# Patient Record
Sex: MALE | Race: WHITE | Employment: FULL TIME | URBAN - METROPOLITAN AREA
[De-identification: names, ages, dates, MRNs, and addresses within clinical notes are randomized per-mention and may not be internally consistent; named-entity substitution may affect disease eponyms.]

---

## 2022-06-14 ENCOUNTER — OFFICE VISIT (OUTPATIENT)
Dept: ORTHOPEDIC SURGERY | Age: 27
End: 2022-06-14

## 2022-06-14 DIAGNOSIS — S99.921D FOOT INJURY, RIGHT, SUBSEQUENT ENCOUNTER: Primary | ICD-10-CM

## 2022-06-14 PROCEDURE — 99024 POSTOP FOLLOW-UP VISIT: CPT | Performed by: ORTHOPAEDIC SURGERY

## 2022-06-14 NOTE — PROGRESS NOTES
Name: Kelly Galaviz   YOB: 1995   Gender: male   MRN: [id number]       Summary: Zone 1 non displaced base 5th Metatarsal  fracture with significant ankle sprain: Right sided      Healing well   WBAT in shoe. F/u in 2 months      Close treatment begins in 4/1/2022           CC: Right lateral foot  pain      HPI: Kelly Galaviz is a  32 y.o. male who had a rolling of his ankle this past day. .  They immediately had lateral  foot pain, and soon began to swell. Ambulation has been very difficulty and painful. The pain is there all the time but more painful w/ direct pressure or weight bearing. He went to the urgent care diagnosed him with fifth metatarsal base fracture. I gave him a boot. He states he has a hard time walking cannot work at this point. We diagnosed him w/ a R zone 1 5th MT base fx. His pain has imprvoed in the boot. He is back at work. His pain is improving. He states he has a little bit of issues still but no signs of significant issues. He is back to doing most work with minimal pain. History was obtained by patient      ROS/Meds/PSH/PMH/FH/SH: I personally reviewed the patients standard intake form. Below are the pertinents      Tobacco:  has no history on file for tobacco use. Diabetes: None   Other: none      Physical Examination:   RightLower Extremity: 2+ DP pulse. +silt to s/s/sp/dp/t. Ecchymosis and edema over the lateral foot. TTP at the 5th MT, specifically the base. No signs of open injury or laceration. Mild TTP around the lateral ankle too, however must the pain is over the lateral foot at the 5th base. ROM ankle is normal, however any foot motion is painful. Imaging:          I independently interpreted x-rays taken today in the office    X-Ray RIGHT Foot 3 vw (AP/Lateral/Oblique) for foot pain       Findings: Zone 1 5th metatarsal base fracture. I see no signs of dislocation or chronic injuries.   Midfoot and hindfoot have no fractures or dislocations. No signs of neoplastic disease. .   Interval healing of the fracture is noted. There is interval healing noted. Impression: Zone 1 5th Metatarsal base  fracture       Signature: Kati Broderick MD           Assessment:    5th Metatarsal fracture      Plan:    3 This is an acute, uncomplicated illness/injury   Treatment at this time: Prescription Drug Management and Closed treatment of fracture without manipulation which is an elective major procedure without identified risk factors        Follow up       Closed treatment: we discussed operative and non-operative treatment options. I had a in-depth discussion with the patient regarding non-operative treatment. I discussed the risk of displacement,  nonunion, malunion, posttraumatic arthritis,chronic pain,and potential surgeries in the future if these occur. the patient understood and accepted these risks   Specifically I discussed Non union of the 5th Metatarsal.  I also discussed how surgery can potentially damage the peroneal tendons, damage the sural nerve, and how the we may have to use a plate if the screw damages the bone.